# Patient Record
Sex: FEMALE | Race: AMERICAN INDIAN OR ALASKA NATIVE | NOT HISPANIC OR LATINO
[De-identification: names, ages, dates, MRNs, and addresses within clinical notes are randomized per-mention and may not be internally consistent; named-entity substitution may affect disease eponyms.]

---

## 2017-03-30 ENCOUNTER — TRANSCRIPTION ENCOUNTER (OUTPATIENT)
Age: 57
End: 2017-03-30

## 2017-06-20 ENCOUNTER — OUTPATIENT (OUTPATIENT)
Dept: OUTPATIENT SERVICES | Facility: HOSPITAL | Age: 57
LOS: 1 days | Discharge: HOME | End: 2017-06-20

## 2017-06-28 DIAGNOSIS — D50.9 IRON DEFICIENCY ANEMIA, UNSPECIFIED: ICD-10-CM

## 2017-06-28 DIAGNOSIS — M15.8 OTHER POLYOSTEOARTHRITIS: ICD-10-CM

## 2017-06-28 DIAGNOSIS — D64.9 ANEMIA, UNSPECIFIED: ICD-10-CM

## 2017-06-28 DIAGNOSIS — N39.0 URINARY TRACT INFECTION, SITE NOT SPECIFIED: ICD-10-CM

## 2017-06-28 DIAGNOSIS — D51.8 OTHER VITAMIN B12 DEFICIENCY ANEMIAS: ICD-10-CM

## 2017-06-28 DIAGNOSIS — E03.8 OTHER SPECIFIED HYPOTHYROIDISM: ICD-10-CM

## 2017-06-28 DIAGNOSIS — E78.00 PURE HYPERCHOLESTEROLEMIA, UNSPECIFIED: ICD-10-CM

## 2017-06-28 DIAGNOSIS — M10.00 IDIOPATHIC GOUT, UNSPECIFIED SITE: ICD-10-CM

## 2017-11-13 ENCOUNTER — TRANSCRIPTION ENCOUNTER (OUTPATIENT)
Age: 57
End: 2017-11-13

## 2018-02-02 ENCOUNTER — OUTPATIENT (OUTPATIENT)
Dept: OUTPATIENT SERVICES | Facility: HOSPITAL | Age: 58
LOS: 1 days | Discharge: HOME | End: 2018-02-02

## 2018-02-02 DIAGNOSIS — Z00.00 ENCOUNTER FOR GENERAL ADULT MEDICAL EXAMINATION WITHOUT ABNORMAL FINDINGS: ICD-10-CM

## 2018-10-31 ENCOUNTER — OUTPATIENT (OUTPATIENT)
Dept: OUTPATIENT SERVICES | Facility: HOSPITAL | Age: 58
LOS: 1 days | Discharge: HOME | End: 2018-10-31

## 2018-10-31 DIAGNOSIS — Z13.21 ENCOUNTER FOR SCREENING FOR NUTRITIONAL DISORDER: ICD-10-CM

## 2018-10-31 DIAGNOSIS — Z00.00 ENCOUNTER FOR GENERAL ADULT MEDICAL EXAMINATION WITHOUT ABNORMAL FINDINGS: ICD-10-CM

## 2018-10-31 DIAGNOSIS — R73.09 OTHER ABNORMAL GLUCOSE: ICD-10-CM

## 2018-10-31 DIAGNOSIS — Z13.220 ENCOUNTER FOR SCREENING FOR LIPOID DISORDERS: ICD-10-CM

## 2018-10-31 DIAGNOSIS — R79.0 ABNORMAL LEVEL OF BLOOD MINERAL: ICD-10-CM

## 2018-10-31 DIAGNOSIS — E11.9 TYPE 2 DIABETES MELLITUS WITHOUT COMPLICATIONS: ICD-10-CM

## 2018-10-31 DIAGNOSIS — R94.6 ABNORMAL RESULTS OF THYROID FUNCTION STUDIES: ICD-10-CM

## 2018-10-31 DIAGNOSIS — R97.0 ELEVATED CARCINOEMBRYONIC ANTIGEN [CEA]: ICD-10-CM

## 2018-10-31 DIAGNOSIS — R79.1 ABNORMAL COAGULATION PROFILE: ICD-10-CM

## 2018-10-31 DIAGNOSIS — N39.0 URINARY TRACT INFECTION, SITE NOT SPECIFIED: ICD-10-CM

## 2018-10-31 DIAGNOSIS — R80.9 PROTEINURIA, UNSPECIFIED: ICD-10-CM

## 2018-10-31 DIAGNOSIS — Z13.0 ENCOUNTER FOR SCREENING FOR DISEASES OF THE BLOOD AND BLOOD-FORMING ORGANS AND CERTAIN DISORDERS INVOLVING THE IMMUNE MECHANISM: ICD-10-CM

## 2018-10-31 DIAGNOSIS — R79.89 OTHER SPECIFIED ABNORMAL FINDINGS OF BLOOD CHEMISTRY: ICD-10-CM

## 2019-06-18 ENCOUNTER — APPOINTMENT (OUTPATIENT)
Dept: OTOLARYNGOLOGY | Facility: CLINIC | Age: 59
End: 2019-06-18

## 2020-04-03 ENCOUNTER — TRANSCRIPTION ENCOUNTER (OUTPATIENT)
Age: 60
End: 2020-04-03

## 2020-04-21 ENCOUNTER — TRANSCRIPTION ENCOUNTER (OUTPATIENT)
Age: 60
End: 2020-04-21

## 2020-04-25 ENCOUNTER — MESSAGE (OUTPATIENT)
Age: 60
End: 2020-04-25

## 2020-05-12 LAB
SARS-COV-2 IGG SERPL IA-ACNC: 6.9 INDEX
SARS-COV-2 IGG SERPL QL IA: POSITIVE

## 2022-07-05 ENCOUNTER — APPOINTMENT (OUTPATIENT)
Dept: ORTHOPEDIC SURGERY | Facility: CLINIC | Age: 62
End: 2022-07-05

## 2022-07-05 PROCEDURE — 99213 OFFICE O/P EST LOW 20 MIN: CPT

## 2022-07-05 RX ORDER — GABAPENTIN 300 MG/1
300 CAPSULE ORAL 3 TIMES DAILY
Qty: 90 | Refills: 2 | Status: ACTIVE | COMMUNITY
Start: 2022-07-05 | End: 1900-01-01

## 2022-07-05 NOTE — ASSESSMENT
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  I think that dysesthetic sensation is an exacerbation of a borderline peripheral neuropathy from her diabetes.  To that end I would give her a prescription for gabapentin 300 mg 3 times a day to help mitigate the dysesthetic sensation.  She can continue with the ibuprofen for pain relief.  At this point time I have her start to transition out of the Cam boot to a hard-soled shoe.  The alternative is a carbon fiber footplate placed within a comfortable shoe.  Because of her tenderness and swelling I would keep her out of work for at least another 4-6 weeks until she returns for follow-up.  In 2 weeks time I would like her to start some physical therapy which will focus on work conditioning ankle motion ankle strengthening gait training balance and generalized conditioning with modalities utilized adjunct therapy.  Will see her back in the office in 4-6 weeks time for repeat evaluation.  On follow-up we will repeat radiographs of her right foot.  All questions were answered to her and her 's satisfaction.

## 2022-07-05 NOTE — IMAGING
[de-identified] :  Luz Maria middle-aged woman sits comfortably in my office in no distress.  She is accompanied by her .  She sits in a wheelchair.  She is in no distress.\par \par Physical examination:\par -right foot and ankle:  No tenderness palpation of the medial lateral malleolus.  Mild tenderness over the base of the 5th metatarsal.  Moderate swelling about her foot.  Dysesthetic sensation about her toes particularly the small toe.  0.5 some monofilament testing suggest that protective sensation is intact.  Good capillary refill.  2+ DP pulses.  No callosities or ulcers.  No retrocalcaneal tenderness or lymph nodes.  Arch is reasonably well maintained.  Midfoot relatively supple.\par \par Radiographs of the patient's right ankle and foot were Re reviewed from June 17, 2022. These demonstrate a nondisplaced base of 5th metatarsal fracture.  There is a borderline suggestion of a Gay a lateral malleolar ankle fracture but clinical examination suggest against the fracture in this area.

## 2022-07-05 NOTE — HISTORY OF PRESENT ILLNESS
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  She is placed in a Cam boot and returns today for follow-up.  No prior history of fractures she reports some discomfort diffusely over her foot.  She is so mostly bothered by dysesthetic sensation about her small toe.  Denies any ankle pain.  Denies any prior fractures or problems with her foot.  She remains on Motrin as needed for pain relief.  He finds this very helpful.

## 2022-08-19 ENCOUNTER — APPOINTMENT (OUTPATIENT)
Dept: ORTHOPEDIC SURGERY | Facility: CLINIC | Age: 62
End: 2022-08-19

## 2022-08-19 DIAGNOSIS — S92.354A NONDISPLACED FRACTURE OF FIFTH METATARSAL BONE, RIGHT FOOT, INITIAL ENCOUNTER FOR CLOSED FRACTURE: ICD-10-CM

## 2022-08-19 DIAGNOSIS — S82.64XD NONDISPLACED FRACTURE OF LATERAL MALLEOLUS OF RIGHT FIBULA, SUBSEQUENT ENCOUNTER FOR CLOSED FRACTURE WITH ROUTINE HEALING: ICD-10-CM

## 2022-08-19 PROCEDURE — 99213 OFFICE O/P EST LOW 20 MIN: CPT

## 2022-08-19 NOTE — HISTORY OF PRESENT ILLNESS
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 She is transitioned out of her Cam boot.  Notes her pain has improved but still has some forefoot pain about her small toes.  She notes that physical therapy has been helpful and this is not only helped her ankle motion and foot motion but also her pain in her toes.  She is not taking any medications for the pain in her foot her ankle at this time.  She uses a crutch for balance.

## 2022-08-19 NOTE — ASSESSMENT
[FreeTextEntry1] :   61-year-old woman who is healing her right Gay a lateral malleolar ankle fracture and base of 5th metatarsal fractures uneventfully.  I think she has healed sufficiently she can discontinue her Cam boot.  She can walk in her crock 6.  I would like her to get started in physical therapy working on ankle motion and strengthening exercises, gait training balance and generalized conditioning with modalities utilized adjunct therapy.  Physical therapy should work to teach her self-directed exercise program.  Will give her prescription for cane so the that she can use this to offload her foot lateral she learns to walk better.  She would utilize the cane in her left hand.  As she has better function she will get rid of the cane.  If she has any pain I would continue with the over-the-counter NSAIDs and or Tylenol.  Will have her check in with us in 6 weeks to see how she is getting along.  I would only repeat x-rays of her ankle and foot if she is having worsening symptoms of pain.  All questions were answered to her satisfaction.

## 2022-08-19 NOTE — REASON FOR VISIT
[FreeTextEntry2] : Follow-up right 5th metatarsal fracture and questionable right lateral malleolar ankle fracture.

## 2022-08-19 NOTE — IMAGING
[de-identified] :   Luz Maria middle-aged woman sits comfortably my office in no distress.  She walks with hesitancy but no antalgia.\par \par Physical examination:\par Right foot and ankle:  No tenderness palpation over lateral malleolus.  No significant tenderness over the base of the 5th metatarsal.  There is some mild to moderate residual swelling about her foot and ankle.  Palpable callus is appreciated at the base of the 5th metatarsal.  Sensation grossly intact although I suspected somewhat dysesthetic.  0.57 monofilament testing deferred.  No callosities or ulcers.  Good capillary refill.  2+ DP pulses.  Arch is maintained.  Calf is soft without cords.  Ankle motion demonstrates 10° of dorsiflexion and 30° of plantar flexion.  Inversion is 10° and eversion 10°.\par \par Radiographs from August 18, 2022 from [facility] reviewed.  These include right ankle radiographs (AP lateral and mortise) which demonstrate nondisplaced Gay a lateral malleolar ankle fracture with evidence of interval healing.  Talus sits with the mortise appropriately.  There is no medial clear space widening or lateral talar shift.  Appropriate tib-fib overlap.  Radiographs of her foot (AP, lateral, oblique) demonstrate a nondisplaced metaphyseal right base of 5th metatarsal fracture with early callus formation.  This fracture is nondisplaced.

## 2022-10-04 ENCOUNTER — APPOINTMENT (OUTPATIENT)
Dept: ORTHOPEDIC SURGERY | Facility: CLINIC | Age: 62
End: 2022-10-04

## 2022-10-25 ENCOUNTER — OUTPATIENT (OUTPATIENT)
Dept: OUTPATIENT SERVICES | Facility: HOSPITAL | Age: 62
LOS: 1 days | Discharge: HOME | End: 2022-10-25

## 2022-10-25 DIAGNOSIS — M79.673 PAIN IN UNSPECIFIED FOOT: ICD-10-CM

## 2022-10-25 PROCEDURE — 73630 X-RAY EXAM OF FOOT: CPT | Mod: 26,RT

## 2022-10-25 PROCEDURE — 73610 X-RAY EXAM OF ANKLE: CPT | Mod: 26,RT

## 2022-12-18 ENCOUNTER — NON-APPOINTMENT (OUTPATIENT)
Age: 62
End: 2022-12-18

## 2023-03-01 ENCOUNTER — APPOINTMENT (OUTPATIENT)
Dept: ORTHOPEDIC SURGERY | Facility: CLINIC | Age: 63
End: 2023-03-01

## 2023-03-01 ENCOUNTER — APPOINTMENT (OUTPATIENT)
Dept: ORTHOPEDIC SURGERY | Facility: CLINIC | Age: 63
End: 2023-03-01
Payer: COMMERCIAL

## 2023-03-01 VITALS — WEIGHT: 114 LBS | BODY MASS INDEX: 23.93 KG/M2 | HEIGHT: 58 IN

## 2023-03-01 DIAGNOSIS — S76.011A STRAIN OF MUSCLE, FASCIA AND TENDON OF RIGHT HIP, INITIAL ENCOUNTER: ICD-10-CM

## 2023-03-01 PROCEDURE — 73502 X-RAY EXAM HIP UNI 2-3 VIEWS: CPT

## 2023-03-01 PROCEDURE — 99213 OFFICE O/P EST LOW 20 MIN: CPT

## 2023-03-01 PROCEDURE — 72100 X-RAY EXAM L-S SPINE 2/3 VWS: CPT

## 2023-03-01 RX ORDER — TIZANIDINE 4 MG/1
4 TABLET ORAL
Qty: 30 | Refills: 0 | Status: ACTIVE | COMMUNITY
Start: 2023-03-01 | End: 1900-01-01

## 2023-03-01 NOTE — HISTORY OF PRESENT ILLNESS
[de-identified] : 62-year-old female here for an evaluation of pain to the right buttocks, patient stated this pain is started on February 23, 2023, patient stated that she has significant difficulty walking, she been using a cane for ambulation.\par Patient states that the pain is worse when she applies weight to her right lower extremity.\par Patient does not recall any trauma or any injury.\par \par Medical problems diabetes, hypertension, Crohn's disease Raynaud's disease.\par Current medications Invokana, lisinopril, losartan, baby aspirin, multivitamins\par Allergies to codeine.

## 2023-03-01 NOTE — IMAGING
[de-identified] : On examination patient is a 62-year-old female petite well-nourished well-developed in no apparent distress, ambulating with a cane, patient has antalgic gait.\par Patient has good range of motion to the right hip to forward flexion, internal and external rotation with mild end of range pain over the gluteal area.\par Good motor strength to all muscle groups to the right hip.\par Negative logrolling.\par Patient has significant tenderness over the gluteal region.\par There is no pain over the lumbar spine or the lumbar paraspinal muscles.\par Negative straight leg raise.\par Neurovascular intact.\par \par X-ray of the right hip: Negative for any acute fracture or dislocation\par X-ray of the lumbar spine, negative for any acute fracture or dislocation

## 2023-03-01 NOTE — DISCUSSION/SUMMARY
[de-identified] : Impression: Right gluteal strain\par \par Plan: Patient was advised for physical therapy.\par Patient was advised for an MRI of the right hip to evaluate a gluteal tear.\par Prescription for tizanidine was sent to the pharmacy, patient was advised to take over-the-counter anti-inflammatories for\par \par Follow-up: 4-6 weeks for repeat evaluation with Dr. Bishop\par \par Supervising physician Dr. Bishop

## 2023-03-11 ENCOUNTER — APPOINTMENT (OUTPATIENT)
Dept: MRI IMAGING | Facility: CLINIC | Age: 63
End: 2023-03-11

## 2023-03-27 ENCOUNTER — APPOINTMENT (OUTPATIENT)
Dept: ORTHOPEDIC SURGERY | Facility: CLINIC | Age: 63
End: 2023-03-27

## 2023-06-20 ENCOUNTER — NON-APPOINTMENT (OUTPATIENT)
Age: 63
End: 2023-06-20

## 2023-11-18 ENCOUNTER — NON-APPOINTMENT (OUTPATIENT)
Age: 63
End: 2023-11-18

## 2023-11-28 RX ORDER — OFLOXACIN 3 MG/ML
0.3 SOLUTION/ DROPS OPHTHALMIC
Qty: 1 | Refills: 0 | Status: ACTIVE | COMMUNITY
Start: 2023-11-28 | End: 1900-01-01

## 2024-01-17 ENCOUNTER — NON-APPOINTMENT (OUTPATIENT)
Age: 64
End: 2024-01-17

## 2025-01-09 ENCOUNTER — NON-APPOINTMENT (OUTPATIENT)
Age: 65
End: 2025-01-09

## 2025-03-26 ENCOUNTER — NON-APPOINTMENT (OUTPATIENT)
Age: 65
End: 2025-03-26

## 2025-04-03 ENCOUNTER — NON-APPOINTMENT (OUTPATIENT)
Age: 65
End: 2025-04-03

## 2025-04-03 ENCOUNTER — APPOINTMENT (OUTPATIENT)
Dept: OTOLARYNGOLOGY | Facility: CLINIC | Age: 65
End: 2025-04-03
Payer: COMMERCIAL

## 2025-04-03 VITALS — WEIGHT: 98 LBS | BODY MASS INDEX: 21.14 KG/M2 | HEIGHT: 57 IN

## 2025-04-03 DIAGNOSIS — J31.0 CHRONIC RHINITIS: ICD-10-CM

## 2025-04-03 DIAGNOSIS — R09.81 NASAL CONGESTION: ICD-10-CM

## 2025-04-03 DIAGNOSIS — R09.82 POSTNASAL DRIP: ICD-10-CM

## 2025-04-03 PROCEDURE — 31231 NASAL ENDOSCOPY DX: CPT

## 2025-04-03 PROCEDURE — 99204 OFFICE O/P NEW MOD 45 MIN: CPT | Mod: 25

## 2025-04-03 RX ORDER — IPRATROPIUM BROMIDE 42 UG/1
0.06 SPRAY, METERED NASAL 3 TIMES DAILY
Qty: 2 | Refills: 6 | Status: ACTIVE | COMMUNITY
Start: 2025-04-03 | End: 1900-01-01

## 2025-04-03 RX ORDER — MONTELUKAST 10 MG/1
10 TABLET, FILM COATED ORAL DAILY
Qty: 30 | Refills: 6 | Status: ACTIVE | COMMUNITY
Start: 2025-04-03 | End: 1900-01-01

## 2025-04-03 RX ORDER — AZELASTINE HYDROCHLORIDE 137 UG/1
0.1 SPRAY, METERED NASAL DAILY
Qty: 30 | Refills: 6 | Status: ACTIVE | COMMUNITY
Start: 2025-04-03 | End: 1900-01-01

## 2025-04-03 RX ORDER — FEXOFENADINE HCL 60 MG/1
60 TABLET, FILM COATED ORAL
Qty: 40 | Refills: 3 | Status: ACTIVE | COMMUNITY
Start: 2025-04-03 | End: 1900-01-01

## 2025-04-03 RX ORDER — FAMOTIDINE 40 MG/1
40 TABLET, FILM COATED ORAL
Qty: 30 | Refills: 2 | Status: ACTIVE | COMMUNITY
Start: 2025-04-03 | End: 1900-01-01

## 2025-04-30 ENCOUNTER — NON-APPOINTMENT (OUTPATIENT)
Age: 65
End: 2025-04-30